# Patient Record
Sex: MALE | Race: WHITE
[De-identification: names, ages, dates, MRNs, and addresses within clinical notes are randomized per-mention and may not be internally consistent; named-entity substitution may affect disease eponyms.]

---

## 2021-06-15 ENCOUNTER — HOSPITAL ENCOUNTER (OUTPATIENT)
Dept: HOSPITAL 53 - M LABSMTC | Age: 26
End: 2021-06-15
Attending: PEDIATRICS
Payer: COMMERCIAL

## 2021-06-15 DIAGNOSIS — Z20.822: Primary | ICD-10-CM

## 2021-07-21 ENCOUNTER — HOSPITAL ENCOUNTER (EMERGENCY)
Dept: HOSPITAL 53 - M ED | Age: 26
Discharge: HOME | End: 2021-07-21
Payer: COMMERCIAL

## 2021-07-21 VITALS — OXYGEN SATURATION: 99 %

## 2021-07-21 VITALS — WEIGHT: 170.35 LBS | HEIGHT: 65 IN | BODY MASS INDEX: 28.38 KG/M2

## 2021-07-21 VITALS — SYSTOLIC BLOOD PRESSURE: 127 MMHG | DIASTOLIC BLOOD PRESSURE: 60 MMHG

## 2021-07-21 DIAGNOSIS — Z91.010: ICD-10-CM

## 2021-07-21 DIAGNOSIS — R06.02: ICD-10-CM

## 2021-07-21 DIAGNOSIS — Z77.22: ICD-10-CM

## 2021-07-21 DIAGNOSIS — I45.19: ICD-10-CM

## 2021-07-21 DIAGNOSIS — J45.901: Primary | ICD-10-CM

## 2021-07-21 DIAGNOSIS — F32.9: ICD-10-CM

## 2021-07-21 LAB
ALBUMIN SERPL BCG-MCNC: 3.8 GM/DL (ref 3.2–5.2)
ALT SERPL W P-5'-P-CCNC: 16 U/L (ref 12–78)
BASOPHILS # BLD AUTO: 0 10^3/UL (ref 0–0.2)
BASOPHILS NFR BLD AUTO: 0.7 % (ref 0–1)
BILIRUB CONJ SERPL-MCNC: < 0.1 MG/DL (ref 0–0.2)
BILIRUB SERPL-MCNC: 0.2 MG/DL (ref 0.2–1)
BUN SERPL-MCNC: 13 MG/DL (ref 7–18)
CALCIUM SERPL-MCNC: 8.5 MG/DL (ref 8.5–10.1)
CHLORIDE SERPL-SCNC: 110 MEQ/L (ref 98–107)
CK MB CFR.DF SERPL CALC: 1.12
CK MB SERPL-MCNC: 2.3 NG/ML (ref ?–3.6)
CK SERPL-CCNC: 206 U/L (ref 39–308)
CO2 SERPL-SCNC: 26 MEQ/L (ref 21–32)
CREAT SERPL-MCNC: 0.8 MG/DL (ref 0.7–1.3)
CRP SERPL-MCNC: 0.74 MG/DL (ref 0–0.3)
EOSINOPHIL # BLD AUTO: 0.1 10^3/UL (ref 0–0.5)
EOSINOPHIL NFR BLD AUTO: 2.5 % (ref 0–3)
ERYTHROCYTE [SEDIMENTATION RATE] IN BLOOD BY WESTERGREN METHOD: 9 MM/HR (ref 0–15)
GFR SERPL CREATININE-BSD FRML MDRD: > 60 ML/MIN/{1.73_M2} (ref 60–?)
GLUCOSE SERPL-MCNC: 86 MG/DL (ref 70–100)
HCT VFR BLD AUTO: 43.5 % (ref 42–52)
HGB BLD-MCNC: 14.5 G/DL (ref 13.5–17.5)
LYMPHOCYTES # BLD AUTO: 1.3 10^3/UL (ref 1.5–5)
LYMPHOCYTES NFR BLD AUTO: 22.7 % (ref 24–44)
MCH RBC QN AUTO: 29 PG (ref 27–33)
MCHC RBC AUTO-ENTMCNC: 33.3 G/DL (ref 32–36.5)
MCV RBC AUTO: 87 FL (ref 80–96)
MONOCYTES # BLD AUTO: 0.5 10^3/UL (ref 0–0.8)
MONOCYTES NFR BLD AUTO: 9.6 % (ref 2–8)
NEUTROPHILS # BLD AUTO: 3.5 10^3/UL (ref 1.5–8.5)
NEUTROPHILS NFR BLD AUTO: 64 % (ref 36–66)
PLATELET # BLD AUTO: 246 10^3/UL (ref 150–450)
POTASSIUM SERPL-SCNC: 4.2 MEQ/L (ref 3.5–5.1)
PROT SERPL-MCNC: 7.2 GM/DL (ref 6.4–8.2)
RBC # BLD AUTO: 5 10^6/UL (ref 4.3–6.1)
SODIUM SERPL-SCNC: 142 MEQ/L (ref 136–145)
TROPONIN I SERPL-MCNC: < 0.02 NG/ML (ref ?–0.1)
WBC # BLD AUTO: 5.5 10^3/UL (ref 4–10)

## 2021-07-21 NOTE — REP
INDICATION:

Abdominal Pain



COMPARISON:

None.



TECHNIQUE:

PA/Lateral



FINDINGS:

Lungs: Clear, no infiltrate.

Heart: Normal in size.

Mediastinum: Mediastinal silhouette unremarkable.

Pleural angles: Unremarkable..

Bones and soft tissues: Unremarkable.



IMPRESSION:

No acute pulmonary disease.





<Electronically signed by Pipo Thakkar > 07/21/21 [id number]

## 2021-07-21 NOTE — ECGEPIP
[facility] - ED

                                       

                                       Test Date:    2021

Pat Name:     ALEJANDRO AGUSTIN             Department:   

Patient ID:   [id number]                 Room:         -

Gender:       Male                     Technician:   COLIN

:          1995               Requested By: TEA KOHLI PA-C

Order Number: [id number]     Reading MD:   Joe Ge

                                 Measurements

Intervals                              Axis          

Rate:         61                       P:            51

TN:           130                      QRS:          -1

QRSD:         96                       T:            23

QT:           408                                    

QTc:          410                                    

                           Interpretive Statements

Normal sinus rhythm with sinus arrhythmia

Incomplete right bundle branch block

NO PRIORS FOR COMPARISON

Electronically Signed on 2021 17:24:29 EDT by Joe Ge